# Patient Record
Sex: MALE | Race: OTHER
[De-identification: names, ages, dates, MRNs, and addresses within clinical notes are randomized per-mention and may not be internally consistent; named-entity substitution may affect disease eponyms.]

---

## 2017-03-25 ENCOUNTER — HOSPITAL ENCOUNTER (EMERGENCY)
Dept: HOSPITAL 74 - JERFT | Age: 27
Discharge: HOME | End: 2017-03-25
Payer: SELF-PAY

## 2017-03-25 VITALS — SYSTOLIC BLOOD PRESSURE: 123 MMHG | HEART RATE: 87 BPM | TEMPERATURE: 98.2 F | DIASTOLIC BLOOD PRESSURE: 94 MMHG

## 2017-03-25 VITALS — BODY MASS INDEX: 39.9 KG/M2
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
## 2017-03-25 DIAGNOSIS — S86.111A: Primary | ICD-10-CM

## 2017-03-25 PROCEDURE — 3E0233Z INTRODUCTION OF ANTI-INFLAMMATORY INTO MUSCLE, PERCUTANEOUS APPROACH: ICD-10-PCS

## 2017-03-25 NOTE — PDOC
History of Present Illness





- General


Chief Complaint: Pain


Stated Complaint: LEG PAIN (RT SIDE)


Time Seen by Provider: 03/25/17 14:26





- History of Present Illness


Initial Comments: 


03/25/17 14:47


CHIEF COMPLAINT: right calf pain 





HISTORY OF PRESENT ILLNESS: 25 yo M with hx of sciatica presents to ED with 

right calf pain x 3 weeks.  Patient states he has taken ibuprofen and "some 

pain medication from the pharmacy" but is unsure of what.  He reports he pain 

as a shooting pain that starts in his calf and sometimes goes down to his ankle 

or up to his thigh.  He denies any lower back pain and denies any loss of 

sensation to his legs or any loss of bowel or bladder function. 





PAST MEDICAL HISTORY: Denies past medical history





FAMILY HISTORY: Denies





SOCIAL HISTORY: Denies tobacco, alcohol, illicit drug use. 





SURGICAL HISTORY: Denies





ALLERGIES: No known drug allergies





REVIEW OF SYSTEMS


General/Constitutional: Denies fever or chills. Denies weakness, weight change.





HEENT: Denies change in vision. Denies ear pain or discharge. Denies sore 

throat.





Cardiovascular: Denies chest pain or shortness of breath.





Respiratory: Denies cough, wheezing, or hemoptysis.





Gastrointestinal: Denies nausea, vomiting, diarrhea or constipation. Denies 

rectal bleeding.





Genitourinary: Denies dysuria, frequency, or change in urination.





Musculoskeletal: Right calf pain. 





Skin and breasts: Denies rash or easy bruising.





Neurologic: Denies headache, vertigo, loss of consciousness, or loss of 

sensation.








PHYSICAL EXAM


General Appearance: Well-appearing, appropriately dressed.  No apparent 

distress.





Respiratory/Chest: Lungs CTAB.





Cardiovascular: RRR. S1, S2.  





Vascular Pulses: Dorsalis-Pedis (R): 2+, Dorsalis-Pedis (L): 2+





Musculoskeletal/Extremities:  No erythema, edema, or warmth to R calf.  No 

appreciable tenderness to R calf with palpation on exam, negative Andrade 

test. Normal inspection. FROM of all extremities, normal capillary refill.  

Pelvis Stable.  No CVA tenderness. No tenderness to extremities, pedal edema, 

swelling, erythema or deformity.





Integumentary: Appropriate color, dry, warm.  No cyanosis, erythema, jaundice 

or rash





Neurologic: CNs II-XII intact. Fully oriented, alert.  Appropriate mood/affect. 

Motor strength 5/5.  No appreciable EOM palsy, facial droop or sensory deficit.








Past History





- Past Medical History


Allergies/Adverse Reactions: 


 Allergies











Allergy/AdvReac Type Severity Reaction Status Date / Time


 


No Known Allergies Allergy   Verified 03/25/17 14:05











Home Medications: 


Ambulatory Orders





Naproxen [Naprosyn -] 250 mg PO BID #14 tablet 03/25/17 








Other medical history: NONE





- Psycho/Social/Smoking Cessation Hx


Anxiety: No


Suicidal Ideation: No


Smoking Status: No


Smoking History: Never smoked


Number of Cigarettes Smoked Daily: 0


Hx Alcohol Use: Yes (SOCIAL)


Drug/Substance Use Hx: No


Substance Use Type: None





*Physical Exam





- Vital Signs


 Last Vital Signs











Temp Pulse Resp BP Pulse Ox


 


 98.2 F   87   20   123/94   97 


 


 03/25/17 14:01  03/25/17 14:01  03/25/17 14:01  03/25/17 14:01  03/25/17 14:01














Medical Decision Making





- Medical Decision Making





03/25/17 15:14


25 yo M with hx of sciatica presents to ED with right calf pain x 3 weeks.





DVT considered but inconsistent with presentation; no tenderness, swelling, 

erythema, or warmth appreciated to affected leg.  Patient does not have risk 

factors other than obesity. Calf pain most likely secondary to muscle strain. 





-60 mg IM Toradol





Script for naproxen sent to pharm.  Advised patient to take meds as prescribed 

and follow up with orthopedics by the end of next week if symptoms continue.  

Advised patient of signs and symptoms for return to ER; patient verbalized 

understanding and agrees to plan.





*DC/Admit/Observation/Transfer


Diagnosis at time of Disposition: 


 Right calf pain





- Discharge Dispostion


Disposition: HOME


Condition at time of disposition: Stable


Admit: No





- Prescriptions


Prescriptions: 


Naproxen [Naprosyn -] 250 mg PO BID #14 tablet





- Referrals


Referrals: 


Larry Lopez MD [Primary Care Provider] - 


Harman Patterson MD [Staff Physician] - 





- Patient Instructions


Printed Discharge Instructions:  DI for Calf Muscle Strain


Additional Instructions: 


Please take medication as prescribed and follow up with orthopedics as 

discussed.  If you experience any loss of sensation, numbness, or tingling to 

your legs, or are unable to bear weight on your leg, or develop any new or 

worsening symptoms, please return to the ER.

## 2017-04-08 ENCOUNTER — HOSPITAL ENCOUNTER (EMERGENCY)
Dept: HOSPITAL 74 - JERFT | Age: 27
Discharge: HOME | End: 2017-04-08
Payer: SELF-PAY

## 2017-04-08 VITALS — TEMPERATURE: 97.6 F | DIASTOLIC BLOOD PRESSURE: 78 MMHG | HEART RATE: 76 BPM | SYSTOLIC BLOOD PRESSURE: 152 MMHG

## 2017-04-08 VITALS — BODY MASS INDEX: 39.9 KG/M2

## 2017-04-08 DIAGNOSIS — M79.604: Primary | ICD-10-CM

## 2017-04-08 NOTE — PDOC
History of Present Illness





- General


Chief Complaint: Pain


Stated Complaint: RT LEG PAIN


Time Seen by Provider: 04/08/17 13:53


History Source: Patient


Exam Limitations: No Limitations





- History of Present Illness


Initial Comments: 





04/08/17 14:37


26-year-old male presents to the ED with complaints of intermittent right leg 

pain  which he states is worse with ambulation and feels like a tightness to 

the right hamstring and right calf. Patient denies imaging or injury to this 

affected extremity. Patient states was here about a month ago and was given 

Naprosyn 250 which she states did alleviate his discomfort initially but now 

returned and is requesting something different. Patient denies history of 

sedentary lifestyle smoking history, or clotting disorders. Patient denies 

chest pain, shortness of breath, lower extremity edema, or sensory changes 

distal of affected area.


Timing/Duration: intermittent


Severity: mild


Associated Symptoms: reports: denies symptoms





Past History





- Past Medical History


Allergies/Adverse Reactions: 


 Allergies











Allergy/AdvReac Type Severity Reaction Status Date / Time


 


No Known Allergies Allergy   Verified 04/08/17 13:37











Home Medications: 


Ambulatory Orders





NK [No Known Home Medication]  04/08/17 








Other medical history: NONE





- Psycho/Social/Smoking Cessation Hx


Anxiety: No


Suicidal Ideation: No


Smoking Status: No


Smoking History: Never smoked


Number of Cigarettes Smoked Daily: 0


Hx Alcohol Use: Yes (RARE)


Drug/Substance Use Hx: No


Substance Use Type: None


Patient Lives Alone: No


Lives with/in: parents





**Review of Systems





- Review of Systems


Able to Perform ROS?: Yes


Constitutional: No: Symptoms Reported


Musculoskeletal: Yes: Muscle Pain (right hamstring/ right calf)


Integumentary: No: Symptoms Reported


Neurological: No: Symptoms reported





*Physical Exam





- Vital Signs


 Last Vital Signs











Temp Pulse Resp BP Pulse Ox


 


 97.6 F   76   20   152/78   98 


 


 04/08/17 13:34  04/08/17 13:34  04/08/17 13:34  04/08/17 13:34  04/08/17 13:34














- Physical Exam


General Appearance: Yes: Nourished, Appropriately Dressed.  No: Apparent 

Distress


HEENT: positive: EOMI


Respiratory/Chest: positive: Lungs Clear, Normal Breath Sounds.  negative: 

Respiratory Distress, Accessory Muscle Use


Vascular Pulses: Dorsalis-Pedis (R): 1+


Extremity: positive: Normal Capillary Refill, Normal Inspection, Normal Range 

of Motion, Other (negative Homans).  negative: Tender, Pedal Edema, Calf 

Tenderness


Integumentary: positive: Normal Color, Warm, Moist


Neurologic: positive: Motor Strength 5/5 (ambulatory)





ED Treatment Course





- RADIOLOGY


Radiology Studies Ordered: 














 Category Date Time Status


 


 DUPLEX VASCUL US-1 LEG [US] Stat Ultrasound  04/08/17 14:21 Ordered














Medical Decision Making





- Medical Decision Making


04/08/17 14:40


Patient complains of subjective pain to his right hamstring her right calf 

despite taking Naprosyn 250 that he was prescribed here 1 month ago. Patient 

exhibited no reproducible pain but clearly points to his right calf and 

hamstring without tenderness to the posterior patellar region. No palpable mass

,  skin intact, and 2+ pulses distally. Patient was ordered for an ultrasound 

and will consider disposition once resulted. 





04/08/17 15:14


Duplex negative for acute findings. Patient will be discharged home to take 

Motrin for discomfort and follow-up with his  PCP





*DC/Admit/Observation/Transfer


Diagnosis at time of Disposition: 


Lower extremity pain, posterior


Qualifiers:


 Laterality: right Qualified Code(s): M79.604 - Pain in right leg





- Discharge Dispostion


Disposition: HOME


Condition at time of disposition: Good





- Referrals


Referrals: 


Larry Lopez MD [Primary Care Provider] - 





- Patient Instructions


Printed Discharge Instructions:  DI for Leg Pain


Additional Instructions: 


Please perform stretching exercises to alleviate your discomfort.


 May apply heating pad to the affected area and massage.


May take Motrin 600 mg for discomfort.


Follow-up with your PCP

## 2020-12-17 ENCOUNTER — HOSPITAL ENCOUNTER (EMERGENCY)
Dept: HOSPITAL 74 - JER | Age: 30
Discharge: HOME | End: 2020-12-17
Payer: COMMERCIAL

## 2020-12-17 VITALS — HEART RATE: 114 BPM | TEMPERATURE: 98.5 F | DIASTOLIC BLOOD PRESSURE: 80 MMHG | SYSTOLIC BLOOD PRESSURE: 130 MMHG

## 2020-12-17 VITALS — BODY MASS INDEX: 41.2 KG/M2

## 2020-12-17 DIAGNOSIS — U07.1: Primary | ICD-10-CM

## 2020-12-17 PROCEDURE — C9803 HOPD COVID-19 SPEC COLLECT: HCPCS

## 2020-12-17 PROCEDURE — U0003 INFECTIOUS AGENT DETECTION BY NUCLEIC ACID (DNA OR RNA); SEVERE ACUTE RESPIRATORY SYNDROME CORONAVIRUS 2 (SARS-COV-2) (CORONAVIRUS DISEASE [COVID-19]), AMPLIFIED PROBE TECHNIQUE, MAKING USE OF HIGH THROUGHPUT TECHNOLOGIES AS DESCRIBED BY CMS-2020-01-R: HCPCS

## 2023-06-27 ENCOUNTER — HOSPITAL ENCOUNTER (EMERGENCY)
Dept: HOSPITAL 74 - JERFT | Age: 33
Discharge: HOME | End: 2023-06-27
Payer: COMMERCIAL

## 2023-06-27 VITALS
DIASTOLIC BLOOD PRESSURE: 82 MMHG | RESPIRATION RATE: 18 BRPM | TEMPERATURE: 97.6 F | HEART RATE: 105 BPM | SYSTOLIC BLOOD PRESSURE: 121 MMHG

## 2023-06-27 VITALS — BODY MASS INDEX: 40.5 KG/M2
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
## 2023-06-27 DIAGNOSIS — S93.491A: Primary | ICD-10-CM

## 2023-06-27 DIAGNOSIS — M25.471: ICD-10-CM
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
## 2023-06-27 DIAGNOSIS — M25.571: ICD-10-CM

## 2023-08-18 ENCOUNTER — HOSPITAL ENCOUNTER (EMERGENCY)
Dept: HOSPITAL 74 - JER | Age: 33
Discharge: HOME | End: 2023-08-18
Payer: COMMERCIAL

## 2023-08-18 VITALS
DIASTOLIC BLOOD PRESSURE: 90 MMHG | RESPIRATION RATE: 19 BRPM | SYSTOLIC BLOOD PRESSURE: 137 MMHG | TEMPERATURE: 98.9 F | HEART RATE: 98 BPM

## 2023-08-18 VITALS — BODY MASS INDEX: 39.9 KG/M2

## 2023-08-18 DIAGNOSIS — R19.7: Primary | ICD-10-CM

## 2023-08-24 ENCOUNTER — HOSPITAL ENCOUNTER (EMERGENCY)
Dept: HOSPITAL 74 - JER | Age: 33
Discharge: HOME | End: 2023-08-24
Payer: COMMERCIAL

## 2023-08-24 VITALS
SYSTOLIC BLOOD PRESSURE: 101 MMHG | RESPIRATION RATE: 18 BRPM | HEART RATE: 105 BPM | DIASTOLIC BLOOD PRESSURE: 77 MMHG | TEMPERATURE: 97.8 F

## 2023-08-24 VITALS — BODY MASS INDEX: 42.1 KG/M2

## 2023-08-24 DIAGNOSIS — K56.0: ICD-10-CM

## 2023-08-24 DIAGNOSIS — R11.2: ICD-10-CM

## 2023-08-24 DIAGNOSIS — R10.30: Primary | ICD-10-CM

## 2023-08-24 DIAGNOSIS — K52.9: ICD-10-CM

## 2023-08-24 DIAGNOSIS — R10.11: ICD-10-CM

## 2023-08-24 LAB
ALBUMIN SERPL-MCNC: 4.2 G/DL (ref 3.4–5)
ALP SERPL-CCNC: 110 U/L (ref 45–117)
ALT SERPL-CCNC: 50 U/L (ref 13–61)
AMYLASE SERPL-CCNC: 70 U/L (ref 25–115)
ANION GAP SERPL CALC-SCNC: 10 MMOL/L (ref 8–16)
AST SERPL-CCNC: 18 U/L (ref 15–37)
BASOPHILS # BLD: 0.5 % (ref 0–2)
BILIRUB SERPL-MCNC: 0.7 MG/DL (ref 0.2–1)
BUN SERPL-MCNC: 12.5 MG/DL (ref 7–18)
CALCIUM SERPL-MCNC: 9.7 MG/DL (ref 8.5–10.1)
CHLORIDE SERPL-SCNC: 108 MMOL/L (ref 98–107)
CO2 SERPL-SCNC: 25 MMOL/L (ref 21–32)
CREAT SERPL-MCNC: 1.1 MG/DL (ref 0.55–1.3)
DEPRECATED RDW RBC AUTO: 13 % (ref 11.9–15.9)
EOSINOPHIL # BLD: 0.7 % (ref 0–4.5)
GLUCOSE SERPL-MCNC: 106 MG/DL (ref 74–106)
HCT VFR BLD CALC: 46.8 % (ref 35.4–49)
HGB BLD-MCNC: 15.8 GM/DL (ref 11.7–16.9)
LIPASE SERPL-CCNC: 261 U/L (ref 73–393)
LYMPHOCYTES # BLD: 10.6 % (ref 8–40)
MCH RBC QN AUTO: 25.8 PG (ref 25.7–33.7)
MCHC RBC AUTO-ENTMCNC: 33.8 G/DL (ref 32–35.9)
MCV RBC: 76.4 FL (ref 80–96)
MONOCYTES # BLD AUTO: 5.5 % (ref 3.8–10.2)
NEUTROPHILS # BLD: 82.7 % (ref 42.8–82.8)
PLATELET # BLD AUTO: 309 10^3/UL (ref 134–434)
PMV BLD: 9.2 FL (ref 7.5–11.1)
POTASSIUM SERPLBLD-SCNC: 4.3 MMOL/L (ref 3.5–5.1)
PROT SERPL-MCNC: 8.8 G/DL (ref 6.4–8.2)
RBC # BLD AUTO: 6.12 M/MM3 (ref 4–5.6)
SODIUM SERPL-SCNC: 142 MMOL/L (ref 136–145)
WBC # BLD AUTO: 14.6 K/MM3 (ref 4–10)

## 2023-08-24 PROCEDURE — 3E033NZ INTRODUCTION OF ANALGESICS, HYPNOTICS, SEDATIVES INTO PERIPHERAL VEIN, PERCUTANEOUS APPROACH: ICD-10-PCS | Performed by: EMERGENCY MEDICINE

## 2023-08-24 PROCEDURE — 3E033GC INTRODUCTION OF OTHER THERAPEUTIC SUBSTANCE INTO PERIPHERAL VEIN, PERCUTANEOUS APPROACH: ICD-10-PCS | Performed by: EMERGENCY MEDICINE
